# Patient Record
Sex: MALE | Race: WHITE | URBAN - METROPOLITAN AREA
[De-identification: names, ages, dates, MRNs, and addresses within clinical notes are randomized per-mention and may not be internally consistent; named-entity substitution may affect disease eponyms.]

---

## 2017-04-25 ENCOUNTER — OFFICE VISIT (OUTPATIENT)
Dept: DERMATOLOGY | Facility: CLINIC | Age: 72
End: 2017-04-25

## 2017-04-25 VITALS — DIASTOLIC BLOOD PRESSURE: 91 MMHG | SYSTOLIC BLOOD PRESSURE: 148 MMHG | HEART RATE: 76 BPM

## 2017-04-25 DIAGNOSIS — L82.1 SK (SEBORRHEIC KERATOSIS): ICD-10-CM

## 2017-04-25 DIAGNOSIS — L57.0 ACTINIC KERATOSIS: Primary | ICD-10-CM

## 2017-04-25 DIAGNOSIS — L82.0 INFLAMED SEBORRHEIC KERATOSIS: ICD-10-CM

## 2017-04-25 ASSESSMENT — PAIN SCALES - GENERAL: PAINLEVEL: NO PAIN (0)

## 2017-04-25 NOTE — PATIENT INSTRUCTIONS
Cryotherapy    What is it?    Use of a very cold liquid, such as liquid nitrogen, to freeze and destroy abnormal skin cells that need to be removed    What should I expect?    Tenderness and redness    A small blister that might grow and fill with dark purple blood. There may be crusting.    More than one treatment may be needed if the lesions do not go away.    How do I care for the treated area?    Gently wash the area with your hands when bathing.    Use a thin layer of Vaseline to help with healing. You may use a Band-Aid.     The area should heal within 7-10 days and may leave behind a pink or lighter color.     Do not use an antibiotic or Neosporin ointment.     You may take acetaminophen (Tylenol) for pain.     Call your Doctor if you have:    Severe pain    Signs of infection (warmth, redness, cloudy yellow drainage, and or a bad smell)    Questions or concerns    Who should I call with questions?       [facility]: [phone]       [facility]: [phone]       For urgent needs outside of business hours call the [facility] at [phone]        and ask for the dermatology resident on call

## 2017-04-25 NOTE — MR AVS SNAPSHOT
After Visit Summary   4/25/2017    Henry Hill    MRN: [id number]           Patient Information     Date Of Birth          1945        Visit Information        Provider Department      4/25/2017 10:15 AM Anna Lora MD [facility] Dermatology        Today's Diagnoses     Actinic keratosis    -  1    SK (seborrheic keratosis)        Inflamed seborrheic keratosis          Care Instructions    Cryotherapy    What is it?    Use of a very cold liquid, such as liquid nitrogen, to freeze and destroy abnormal skin cells that need to be removed    What should I expect?    Tenderness and redness    A small blister that might grow and fill with dark purple blood. There may be crusting.    More than one treatment may be needed if the lesions do not go away.    How do I care for the treated area?    Gently wash the area with your hands when bathing.    Use a thin layer of Vaseline to help with healing. You may use a Band-Aid.     The area should heal within 7-10 days and may leave behind a pink or lighter color.     Do not use an antibiotic or Neosporin ointment.     You may take acetaminophen (Tylenol) for pain.     Call your Doctor if you have:    Severe pain    Signs of infection (warmth, redness, cloudy yellow drainage, and or a bad smell)    Questions or concerns    Who should I call with questions?       [facility]: [phone]       [facility]: [phone]       For urgent needs outside of business hours call the [facility] at [phone]        and ask for the dermatology resident on call          Follow-ups after your visit        Follow-up notes from your care team     Return in about 6 months (around 10/25/2017).      Who to contact     Please call your clinic at [phone] to:    Ask questions about your health    Make or cancel appointments    Discuss your medicines    Learn about your test results    Speak to  your doctor   If you have compliments or concerns about an experience at your clinic, or if you wish to file a complaint, please contact [facility] Patient Relations at [phone] or email us at [email or web address]         Additional Information About Your Visit        MyChart Information     [employer] gives you secure access to your electronic health record. If you see a primary care provider, you can also send messages to your care team and make appointments. If you have questions, please call your primary care clinic.  If you do not have a primary care provider, please call [phone] and they will assist you.      [employer] is an electronic gateway that provides easy, online access to your medical records. With [employer], you can request a clinic appointment, read your test results, renew a prescription or communicate with your care team.     To access your existing account, please contact your [facility] or call [phone] for assistance.        Care EveryWhere ID     This is your Care EveryWhere ID. This could be used by other organizations to access your [place] medical records  [id number]        Your Vitals Were     Pulse                   76            Blood Pressure from Last 3 Encounters:   04/25/17 (!) 148/91    Weight from Last 3 Encounters:   No data found for Wt              We Performed the Following     DESTRUCT BENIGN LESION, UP TO 14     DESTRUCT PREMALIGNANT LESION, 2-14     DESTRUCT PREMALIGNANT LESION, FIRST        Primary Care Provider Office Phone # Fax #    Rafa Blanco [phone]       [place] [address] [facility] [address]        Thank you!     Thank you for choosing [facility] DERMATOLOGY  for your care. Our goal is always to provide you with excellent care. Hearing back from our patients is one way we can continue to improve our services. Please take a few minutes to complete the  written survey that you may receive in the mail after your visit with us. Thank you!             Your Updated Medication List - Protect others around you: Learn how to safely use, store and throw away your medicines at www.disposemymeds.org.          This list is accurate as of: 4/25/17 11:59 PM.  Always use your most recent med list.                   Brand Name Dispense Instructions for use    ibuprofen 400 MG tablet    ADVIL/MOTRIN     Take 400 mg by mouth 2 times daily       VIAGRA 100 MG cap/tab   Generic drug:  sildenafil      Take 100 mg by mouth as needed

## 2017-04-25 NOTE — NURSING NOTE
"Dermatology Rooming Note    Henry Hill's goals for this visit include:   Chief Complaint   Patient presents with     Derm Problem     Henry is here today for a skin check.  Has concerns with an area on the back of his left thigh that feels \"rough, and can bleed when messed with.\"         Fatmata Georges, LPN    "

## 2017-04-25 NOTE — LETTER
4/25/2017       RE: Henry Hill  [address] [place] [address]     Dear Colleague,    Thank you for referring your patient, Henry Hill, to the [facility]. Please see a copy of my visit note below.    DERMATOLOGY PROBLEM LIST:  Actinic keratoses, status post treatment with photodynamic therapy to the scalp with significant improvement, status post treatment with cryotherapy.        CHIEF COMPLAINT:  Skin check.      HISTORY OF PRESENT ILLNESS:  The patient is a 71-year-old male who returns today for followup of actinic keratoses.  He was last seen by our physician assistant, Kenzie Miguel.  At the last visit, he had undergone photodynamic therapy with significant improvement in the scalp.  He still had some residual actinic keratoses, and these were treated with cryotherapy.  He was supposed to come back 3 months after that, but had some difficulties setting up a time due to now having a job at the [employer], and he was scheduled for today.  He feels he is doing well.  He has not noticed any areas that he is specifically concerned about.  He does have an area on his left posterior thigh, which has been crusty; he has been scratching and picking at it.  It does bleed when he scratches at it.  He would like to have it removed if possible.  He does not have any personal history of skin cancer, but does have significant sun exposure, including tanning beds.      PHYSICAL EXAMINATION:  The patient is a well-appearing male in no acute distress.  Please note the nursing note for vital signs.  Scalp, face, trunk, upper and lower extremities are examined today.  He does have red, gritty papules on the scalp, left eyebrow and tip of nose.  He has a waxy, stuck-on papule of his left posterior thigh.  Scattered, other waxy, stuck-on papules, no atypia.  Scattered lentigo and lentigines, no atypia.      ASSESSMENT AND PLAN:   1.   Actinic keratoses.  A total of 6 were treated today with cryotherapy, including the scalp, left eyebrow and nasal tip.  The patient tolerated it well.  We may need to consider another round of PDT in the future, but not today.   2.  Irritated, inflamed seborrheic keratosis, left posterior thigh.  It has been bleeding and scratched at.  He was treated with 2 cycles of cryotherapy today.   3.  Seborrheic keratoses.  Mostly the seborrheic keratoses are not bothersome.  Reassurance given.  Plan for followup in 6 months for another skin check.     Again, thank you for allowing me to participate in the care of your patient.      Sincerely,    Anna Lora MD

## 2018-02-06 ENCOUNTER — OFFICE VISIT (OUTPATIENT)
Dept: DERMATOLOGY | Facility: CLINIC | Age: 73
End: 2018-02-06
Payer: COMMERCIAL

## 2018-02-06 DIAGNOSIS — L82.1 SK (SEBORRHEIC KERATOSIS): ICD-10-CM

## 2018-02-06 DIAGNOSIS — L57.0 ACTINIC KERATOSIS: Primary | ICD-10-CM

## 2018-02-06 ASSESSMENT — PAIN SCALES - GENERAL
PAINLEVEL: NO PAIN (0)
PAINLEVEL: NO PAIN (1)

## 2018-02-06 NOTE — PATIENT INSTRUCTIONS
Cryotherapy    What is it?    Use of a very cold liquid, such as liquid nitrogen, to freeze and destroy abnormal skin cells that need to be removed    What should I expect?    Tenderness and redness    A small blister that might grow and fill with dark purple blood. There may be crusting.    More than one treatment may be needed if the lesions do not go away.    How do I care for the treated area?    Gently wash the area with your hands when bathing.    Use a thin layer of Vaseline to help with healing. You may use a Band-Aid.     The area should heal within 7-10 days and may leave behind a pink or lighter color.     Do not use an antibiotic or Neosporin ointment.     You may take acetaminophen (Tylenol) for pain.     Call your Doctor if you have:    Severe pain    Signs of infection (warmth, redness, cloudy yellow drainage, and or a bad smell)    Questions or concerns    Who should I call with questions?       [facility]: [phone]       [facility]: [phone]       For urgent needs outside of business hours call the [facility] at [phone]        and ask for the dermatology resident on call

## 2018-02-06 NOTE — PROGRESS NOTES
Progress Notes   Anna Lora MD (Physician)     Dermatology      DERMATOLOGY PROBLEM LIST:  Actinic keratoses, status post treatment with photodynamic therapy to the scalp with significant improvement, status post treatment with cryotherapy.         CHIEF COMPLAINT:  Skin check.       HISTORY OF PRESENT ILLNESS:  The patient is a 72-year-old male who returns today for followup of actinic keratoses.  He had undergone photodynamic therapy with significant improvement in the scalp.  He still had some residual actinic keratoses, and these were treated with cryotherapy at the last visit.  He has noted a few scaly areas of the scalp but overall feels his skin is doing very well.  He does not have any personal history of skin cancer, but does have significant sun exposure, including tanning beds.       PHYSICAL EXAMINATION:  The patient is a well-appearing male in no acute distress.  Please note the nursing note for vital signs.  Scalp, face, trunk, upper and lower extremities are examined today.  He does have red, gritty papules on the scalp.  He has a multiple waxy, stuck-on papules, scattered lentigo and lentigines, no atypia.       ASSESSMENT AND PLAN:   1.  Actinic keratoses.  A total of 4 were treated today with cryotherapy on the scalp.  The patient tolerated it well.  We may need to consider another round of PDT in the future, but not today.   2.  Seborrheic keratosis, non irritated.  No treatment  Follow up in one year.

## 2018-02-06 NOTE — NURSING NOTE
Dermatology Rooming Note    Henry Hill's goals for this visit include:   Chief Complaint   Patient presents with     Skin Check     Henry is here today for a skin check- no areas of concern.      Traci Lyon MA

## 2018-02-06 NOTE — LETTER
2/6/2018       RE: Henry Hill  [address] [facility] [address]     Dear Colleague,    Thank you for referring your patient, Henry Hill, to the [facility] DERMATOLOGY at [facility]. Please see a copy of my visit note below.    Progress Notes   Anna Lora MD (Physician)     Dermatology      DERMATOLOGY PROBLEM LIST:  Actinic keratoses, status post treatment with photodynamic therapy to the scalp with significant improvement, status post treatment with cryotherapy.         CHIEF COMPLAINT:  Skin check.       HISTORY OF PRESENT ILLNESS:  The patient is a 72-year-old male who returns today for followup of actinic keratoses.  He had undergone photodynamic therapy with significant improvement in the scalp.  He still had some residual actinic keratoses, and these were treated with cryotherapy at the last visit.  He has noted a few scaly areas of the scalp but overall feels his skin is doing very well.  He does not have any personal history of skin cancer, but does have significant sun exposure, including tanning beds.       PHYSICAL EXAMINATION:  The patient is a well-appearing male in no acute distress.  Please note the nursing note for vital signs.  Scalp, face, trunk, upper and lower extremities are examined today.  He does have red, gritty papules on the scalp.  He has a multiple waxy, stuck-on papules, scattered lentigo and lentigines, no atypia.       ASSESSMENT AND PLAN:   1.  Actinic keratoses.  A total of 4 were treated today with cryotherapy on the scalp.  The patient tolerated it well.  We may need to consider another round of PDT in the future, but not today.   2.   Seborrheic keratosis, non irritated.  No treatment  Follow up in one year.

## 2018-02-06 NOTE — MR AVS SNAPSHOT
After Visit Summary   2/6/2018    Henry Hill    MRN: [id number]           Patient Information     Date Of Birth          1945        Visit Information        Provider Department      2/6/2018 10:15 AM Anna Lora MD [facility] Dermatology        Today's Diagnoses     Actinic keratosis    -  1    SK (seborrheic keratosis)          Care Instructions    Cryotherapy    What is it?    Use of a very cold liquid, such as liquid nitrogen, to freeze and destroy abnormal skin cells that need to be removed    What should I expect?    Tenderness and redness    A small blister that might grow and fill with dark purple blood. There may be crusting.    More than one treatment may be needed if the lesions do not go away.    How do I care for the treated area?    Gently wash the area with your hands when bathing.    Use a thin layer of Vaseline to help with healing. You may use a Band-Aid.     The area should heal within 7-10 days and may leave behind a pink or lighter color.     Do not use an antibiotic or Neosporin ointment.     You may take acetaminophen (Tylenol) for pain.     Call your Doctor if you have:    Severe pain    Signs of infection (warmth, redness, cloudy yellow drainage, and or a bad smell)    Questions or concerns    Who should I call with questions?       [facility]: [phone]       [facility]: [phone]       For urgent needs outside of business hours call the [facility] at [phone]        and ask for the dermatology resident on call            Follow-ups after your visit        Who to contact     Please call your clinic at [phone] to:    Ask questions about your health    Make or cancel appointments    Discuss your medicines    Learn about your test results    Speak to your doctor   If you have compliments or concerns about an experience at your clinic, or if you wish to file a complaint,  please contact [facility] Patient Relations at [phone] or email us at [email or web address]         Additional Information About Your Visit        [employer] Information     [employer] gives you secure access to your electronic health record. If you see a primary care provider, you can also send messages to your care team and make appointments. If you have questions, please call your primary care clinic.  If you do not have a primary care provider, please call [phone] and they will assist you.      [employer] is an electronic gateway that provides easy, online access to your medical records. With [employer], you can request a clinic appointment, read your test results, renew a prescription or communicate with your care team.     To access your existing account, please contact your [facility] or call [phone] for assistance.        Care EveryWhere ID     This is your Care EveryWhere ID. This could be used by other organizations to access your [place] medical records  [id number]         Blood Pressure from Last 3 Encounters:   04/25/17 (!) 148/91    Weight from Last 3 Encounters:   No data found for Wt              We Performed the Following     DESTRUCT PREMALIGNANT LESION, 2-14     DESTRUCT PREMALIGNANT LESION, FIRST        Primary Care Provider Office Phone # Fax #    Rafa Blanco [phone]       [facility] [address] [facility] [address]        Equal Access to Services     ABISAI SHIPMAN : Hadii ridge montero hadasho Soomaali, waaxda luqadaha, qaybta kaalmada lee, lefty ovalle. So [facility] [phone].    ATENCIÓN: Si habla español, tiene a kohler disposición servicios gratuitos de asistencia lingüística. Lou al [phone].    We comply with applicable federal civil rights laws and [place] laws. We do not discriminate on the basis of race, color, national origin, age, disability,  sex, sexual orientation, or gender identity.            Thank you!     Thank you for choosing [facility] DERMATOLOGY  for your care. Our goal is always to provide you with excellent care. Hearing back from our patients is one way we can continue to improve our services. Please take a few minutes to complete the written survey that you may receive in the mail after your visit with us. Thank you!             Your Updated Medication List - Protect others around you: Learn how to safely use, store and throw away your medicines at www.disposemymeds.org.          This list is accurate as of 2/6/18 10:29 AM.  Always use your most recent med list.                   Brand Name Dispense Instructions for use Diagnosis    ibuprofen 400 MG tablet    ADVIL/MOTRIN     Take 400 mg by mouth 2 times daily        VIAGRA 100 MG tablet   Generic drug:  sildenafil      Take 100 mg by mouth as needed

## 2019-01-09 ENCOUNTER — PATIENT OUTREACH (OUTPATIENT)
Dept: CARE COORDINATION | Facility: CLINIC | Age: 74
End: 2019-01-09

## 2019-01-22 ENCOUNTER — OFFICE VISIT (OUTPATIENT)
Dept: DERMATOLOGY | Facility: CLINIC | Age: 74
End: 2019-01-22
Payer: COMMERCIAL

## 2019-01-22 DIAGNOSIS — L82.1 SK (SEBORRHEIC KERATOSIS): Primary | ICD-10-CM

## 2019-01-22 DIAGNOSIS — D22.9 MULTIPLE NEVI: ICD-10-CM

## 2019-01-22 RX ORDER — ATORVASTATIN CALCIUM 20 MG/1
20 TABLET, FILM COATED ORAL
COMMUNITY
Start: 2019-01-11

## 2019-01-22 RX ORDER — LISINOPRIL AND HYDROCHLOROTHIAZIDE 12.5; 2 MG/1; MG/1
1 TABLET ORAL
COMMUNITY
Start: 2018-12-04

## 2019-01-22 ASSESSMENT — PAIN SCALES - GENERAL: PAINLEVEL: NO PAIN (0)

## 2019-01-22 NOTE — PROGRESS NOTES
[facility] Dermatology Note      Dermatology Problem List:  1. AKs  - s/p cryo and PDT to scalp  2. Xerosis cutis  3. Benign nevi  4. SKs    Encounter Date: Jan 22, 2019    CC:   Chief Complaint   Patient presents with     Skin Check     Henry is here for skin check.         History of Present Illness:  Mr. Henry Hill is a 73 year old male who presents for FBSE. Last seen 2/6/18 at which time he had 4 AKs treated with LN2. Had a small stroke since he was seen last time. Patient denies any painful, bleeding, changing, or darkening lesions.    Patient reports occasional use of SPF. Does wear a hat. Reports history of tanning bed use (~12). Reports history of blistering sunburns (1-2 times). No personal or family history of skin cancer.     Will be moving to [place] permanently.     Past Medical History:   Patient Active Problem List   Diagnosis     Actinic keratosis     SK (seborrheic keratosis)     Past Medical History:   Diagnosis Date     Actinic keratosis 11/4/2016     Arthritis      No past surgical history on file.    Social History:  Patient reports that  has never smoked. he has never used smokeless tobacco. He reports that he drinks alcohol. He reports that he does not use drugs.    Family History:  Family History   Problem Relation Age of Onset     Melanoma No family hx of      Skin Cancer No family hx of        Medications:  Current Outpatient Medications   Medication Sig Dispense Refill     atorvastatin (LIPITOR) 20 MG tablet Take 20 mg by mouth       cholecalciferol (VITAMIN D-1000 MAX ST) 1000 units TABS Take 1,000 Units by mouth       ibuprofen (ADVIL,MOTRIN) 400 MG tablet Take 400 mg by mouth 2 times daily       lisinopril-hydrochlorothiazide (PRINZIDE/ZESTORETIC) 20-12.5 MG tablet Take 1 tablet by mouth       sildenafil (VIAGRA) 100 MG tablet Take 100 mg by mouth as needed          Allergies   Allergen Reactions     Morphine Nausea         Review of Systems:  -As per  HPI  -Constitutional: Otherwise feeling well today, in usual state of health.  -HEENT: Patient denies nonhealing oral sores.  -Skin: As above in HPI. No additional skin concerns.    Physical exam:  Vitals: There were no vitals taken for this visit.  GEN: This is a well developed, well-nourished male in no acute distress, in a pleasant mood.    SKIN: Total skin excluding the undergarment areas was performed. The exam included the head/face, neck, both arms, chest, back, abdomen, buttocks, both legs, digits and/or nails.   -Multiple regular brown pigmented macules and papules are identified on the trunk.   -There are waxy stuck on tan to brown papules on the trunk and extremities.  -There is xerosis of the lower legs.   -No other lesions of concern on areas examined.     Impression/Plan:  1. Actinic keratoses: Previously treated with cryo and PDT to scalp.     None on exam today    ABCDs of melanoma were discussed and self skin checks were advised.    Sun precaution was advised including the use of sun screens of SPF 30 or higher, sun protective clothing, and avoidance of tanning beds.    2. Multiple clinically benign nevi on the trunk    Sun protection as above    3. Seborrheic keratosis, non irritated    Reassured of benign nature of lesions    4. Xerosis cutis    Encouraged gentle skin care with liberal emollient use throughout day      Follow-up in 1 year for FBSE, earlier for new or changing lesions.  Given the name of Dr Filippo Vora to follow up with in [place].       Dr. Lora staffed the patient.    Staff Involved:  Resident(Staci Messer)/Staff(as above)    Staci Messer M.D.  PGY-3 Resident  Dermatology  [facility]  Pager [phone]    .I, Anna Lora MD, saw this patient with the resident and agree with the resident s findings and plan of care as documented in the resident s note.

## 2019-01-22 NOTE — NURSING NOTE
Dermatology Rooming Note    Henry Hill's goals for this visit include:   Chief Complaint   Patient presents with     Skin Check     Henry is here for skin check.     Milady Dhaliwal, CMA

## 2019-01-22 NOTE — PATIENT INSTRUCTIONS
You can see Dr. Filippo Vora in [place] at [facility].       Dry Skin    What is dry skin?    Common skin problem    Can be worse during the winter     Affects all ages    Occurs in people with or without other skin problems    What does it look like?    Fine lines in the skin become more visible     Rough feeling skin     Flaky skin    Most common on the arms and legs    Skin can become cracked, especially on the hands and feet    What are some problems caused by dry skin?     Itching    Rubbing or scratching can cause thickened, rough skin patches    Cracks in skin can be painful    Red, itchy, scaly skin (called eczema) can occur    Yellow crusting or pus could be signs of an infection    What causes dry skin?    A lack of water in the top layer of the skin    Too much soapy water,  hot water, or harsh chemicals    Aging and sun damage    How do I treat dry skin?    Shower or bathe daily for under ten minutes with lukewarm water and mild soap.    Pat yourself dry with a towel gently and leave your skin slightly damp.    Use moisturizing cream or ointment right away.  Avoid lotions.    What kind of mild soap should I be using?    Camay , Dove , Tone , Neutrogena , Purpose , or Oil of Olay     A non-detergent cleanser, like Cetaphil , can be used.    What should I stay away from?    Scented soaps     Bath oils    What moisturizers should I be using?    Cetaphil Cream,CeraVe Cream, Vanicream, Aquaphilic, Eucerin, Aquaphor, or Vaseline     Always apply after showering or bathing.    Reapply throughout the day, if possible.    If dry skin affects your hands, always reapply after handwashing.    What else should I know?    Using a humidifier during winter months may help.    If dry skin gets worse or if eczema develops, a steroid cream may be needed.

## 2019-01-22 NOTE — LETTER
1/22/2019       RE: Henry Hill  [address] [facility] [address]     Dear Colleague,    Thank you for referring your patient, Henry Hill, to the [facility] DERMATOLOGY at [facility]. Please see a copy of my visit note below.    [facility] Dermatology Note      Dermatology Problem List:  1. AKs  - s/p cryo and PDT to scalp  2. Xerosis cutis  3. Benign nevi  4. SKs    Encounter Date: Jan 22, 2019    CC:   Chief Complaint   Patient presents with     Skin Check     Henry is here for skin check.         History of Present Illness:  Mr. Henry Hill is a 73 year old male who presents for FBSE. Last seen 2/6/18 at which time he had 4 AKs treated with LN2. Had a small stroke since he was seen last time. Patient denies any painful, bleeding, changing, or darkening lesions.    Patient reports occasional use of SPF. Does wear a hat. Reports history of tanning bed use (~12). Reports history of blistering sunburns (1-2 times). No personal or family history of skin cancer.     [place]     Past Medical History:   Patient Active Problem List   Diagnosis     Actinic keratosis     SK (seborrheic keratosis)     Past Medical History:   Diagnosis Date     Actinic keratosis 11/4/2016     Arthritis      No past surgical history on file.    Social History:  Patient reports that  has never smoked. he has never used smokeless tobacco. He reports that he drinks alcohol. He reports that he does not use drugs.    Family History:  Family History   Problem Relation Age of Onset     Melanoma No family hx of      Skin Cancer No family hx of        Medications:  Current Outpatient Medications   Medication Sig Dispense Refill     atorvastatin (LIPITOR) 20 MG tablet Take 20 mg by mouth       cholecalciferol (VITAMIN D-1000 MAX ST) 1000 units TABS Take 1,000 Units by mouth       ibuprofen (ADVIL,MOTRIN) 400 MG tablet Take 400 mg by mouth 2  times daily       lisinopril-hydrochlorothiazide (PRINZIDE/ZESTORETIC) 20-12.5 MG tablet Take 1 tablet by mouth       sildenafil (VIAGRA) 100 MG tablet Take 100 mg by mouth as needed          Allergies   Allergen Reactions     Morphine Nausea         Review of Systems:  -As per HPI  -Constitutional: Otherwise feeling well today, in usual state of health.  -HEENT: Patient denies nonhealing oral sores.  -Skin: As above in HPI. No additional skin concerns.    Physical exam:  Vitals: There were no vitals taken for this visit.  GEN: This is a well developed, well-nourished male in no acute distress, in a pleasant mood.    SKIN: Total skin excluding the undergarment areas was performed. The exam included the head/face, neck, both arms, chest, back, abdomen, buttocks, both legs, digits and/or nails.   -Multiple regular brown pigmented macules and papules are identified on the trunk.   -There are waxy stuck on tan to brown papules on the trunk and extremities.  -There is xerosis of the lower legs.   -No other lesions of concern on areas examined.     Impression/Plan:  1. Actinic keratoses: Previously treated with cryo and PDT to scalp.     None on exam today    ABCDs of melanoma were discussed and self skin checks were advised.    Sun precaution was advised including the use of sun screens of SPF 30 or higher, sun protective clothing, and avoidance of tanning beds.    2. Multiple clinically benign nevi on the trunk    Sun protection as above    3. Seborrheic keratosis, non irritated    Reassured of benign nature of lesions    4. Xerosis cutis    Encouraged gentle skin care with liberal emollient use throughout day      Follow-up in 1 year for FBSE, earlier for new or changing lesions.  Given the name of Dr Filippo Vora to follow up with in [place].       Dr. Lora staffed the patient.    Staff Involved:  Resident(Staci Messer)/Staff(as above)    Staci Messer M.D.  PGY-3 Resident  Dermatology  [facility]  Pager  [phone]    .I, Anna Lora MD, saw this patient with the resident and agree with the resident s findings and plan of care as documented in the resident s note.      Again, thank you for allowing me to participate in the care of your patient.      Sincerely,    Anna Lora MD

## 2020-03-10 ENCOUNTER — HEALTH MAINTENANCE LETTER (OUTPATIENT)
Age: 75
End: 2020-03-10

## 2020-12-27 ENCOUNTER — HEALTH MAINTENANCE LETTER (OUTPATIENT)
Age: 75
End: 2020-12-27

## 2021-04-24 ENCOUNTER — HEALTH MAINTENANCE LETTER (OUTPATIENT)
Age: 76
End: 2021-04-24

## 2021-04-29 ENCOUNTER — OFFICE VISIT (OUTPATIENT)
Dept: URBAN - METROPOLITAN AREA CLINIC 59 | Facility: CLINIC | Age: 76
End: 2021-04-29
Payer: COMMERCIAL

## 2021-04-29 DIAGNOSIS — H25.13 AGE-RELATED NUCLEAR CATARACT, BILATERAL: Primary | ICD-10-CM

## 2021-04-29 DIAGNOSIS — H52.4 PRESBYOPIA: ICD-10-CM

## 2021-04-29 DIAGNOSIS — H43.812 VITREOUS DEGENERATION, LEFT EYE: ICD-10-CM

## 2021-04-29 DIAGNOSIS — H17.9 CORNEAL SCAR: ICD-10-CM

## 2021-04-29 PROCEDURE — 92004 COMPRE OPH EXAM NEW PT 1/>: CPT | Performed by: OPTOMETRIST

## 2021-04-29 ASSESSMENT — VISUAL ACUITY
OD: 20/25
OS: 20/20

## 2021-04-29 ASSESSMENT — INTRAOCULAR PRESSURE
OS: 14
OD: 13

## 2021-04-29 NOTE — IMPRESSION/PLAN
Impression: Dry eye syndrome of bilateral lacrimal glands: H04.123. Plan: Patient educated he may want to increase artificial tears to QID OU.

## 2021-04-29 NOTE — IMPRESSION/PLAN
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 Plan: Symptoms of retinal detachment or tears were discussed in detail. If patient notices any symptoms discussed, contact office ASAP.

## 2021-04-29 NOTE — IMPRESSION/PLAN
Impression: Presbyopia: H52.4. Plan: New glasses Rx was given today. Patient educated that glasses will not be perfect due to cataracts.

## 2021-10-09 ENCOUNTER — HEALTH MAINTENANCE LETTER (OUTPATIENT)
Age: 76
End: 2021-10-09

## 2021-11-05 ENCOUNTER — OFFICE VISIT (OUTPATIENT)
Dept: URBAN - METROPOLITAN AREA CLINIC 59 | Facility: CLINIC | Age: 76
End: 2021-11-05
Payer: COMMERCIAL

## 2021-11-05 DIAGNOSIS — H43.813 VITREOUS DEGENERATION, BILATERAL: ICD-10-CM

## 2021-11-05 DIAGNOSIS — H25.813 COMBINED FORMS OF AGE-RELATED CATARACT, BILATERAL: Primary | ICD-10-CM

## 2021-11-05 PROCEDURE — 92014 COMPRE OPH EXAM EST PT 1/>: CPT | Performed by: OPTOMETRIST

## 2021-11-05 ASSESSMENT — VISUAL ACUITY
OD: 20/30
OS: 20/30

## 2021-11-05 ASSESSMENT — INTRAOCULAR PRESSURE
OD: 15
OS: 17

## 2021-11-05 NOTE — IMPRESSION/PLAN
Impression: Dry eye syndrome of bilateral lacrimal glands: H04.123.
by history Plan: Patient educated he may want to increase artificial tears to QID OU.

## 2021-11-05 NOTE — IMPRESSION/PLAN
Impression: Combined forms of age-related cataract, bilateral: H25.813. Plan: Discussed diagnosis of cataracts with patient. Patient educated on new PSC cataracts and affects on vision. Patient has no significant visual complaints at this time and is able to perform all their daily activities. We will re-evaluate cataract on return visit unless patient notes any changes sooner.

## 2022-01-01 NOTE — PROGRESS NOTES
DERMATOLOGY PROBLEM LIST:  Actinic keratoses, status post treatment with photodynamic therapy to the scalp with significant improvement, status post treatment with cryotherapy.        CHIEF COMPLAINT:  Skin check.      HISTORY OF PRESENT ILLNESS:  The patient is a 71-year-old male who returns today for followup of actinic keratoses.  He was last seen by our physician assistant, Kenzie Miguel.  At the last visit, he had undergone photodynamic therapy with significant improvement in the scalp.  He still had some residual actinic keratoses, and these were treated with cryotherapy.  He was supposed to come back 3 months after that, but had some difficulties setting up a time due to now having a job at the [employer], and he was scheduled for today.  He feels he is doing well.  He has not noticed any areas that he is specifically concerned about.  He does have an area on his left posterior thigh, which has been crusty; he has been scratching and picking at it.  It does bleed when he scratches at it.  He would like to have it removed if possible.  He does not have any personal history of skin cancer, but does have significant sun exposure, including tanning beds.      PHYSICAL EXAMINATION:  The patient is a well-appearing male in no acute distress.  Please note the nursing note for vital signs.  Scalp, face, trunk, upper and lower extremities are examined today.  He does have red, gritty papules on the scalp, left eyebrow and tip of nose.  He has a waxy, stuck-on papule of his left posterior thigh.  Scattered, other waxy, stuck-on papules, no atypia.  Scattered lentigo and lentigines, no atypia.      ASSESSMENT AND PLAN:   1.  Actinic keratoses.  A total of 6 were treated today with cryotherapy, including the scalp, left eyebrow and nasal tip.  The patient tolerated it well.  We may need to consider another round of PDT in the future, but not today.   2.  Irritated, inflamed seborrheic keratosis, left posterior  thigh.  It has been bleeding and scratched at.  He was treated with 2 cycles of cryotherapy today.   3.  Seborrheic keratoses.  Mostly the seborrheic keratoses are not bothersome.  Reassurance given.  Plan for followup in 6 months for another skin check.          Statement Selected

## 2022-01-11 ENCOUNTER — OFFICE VISIT (OUTPATIENT)
Dept: URBAN - METROPOLITAN AREA CLINIC 59 | Facility: CLINIC | Age: 77
End: 2022-01-11
Payer: COMMERCIAL

## 2022-01-11 DIAGNOSIS — H04.123 DRY EYE SYNDROME OF BILATERAL LACRIMAL GLANDS: ICD-10-CM

## 2022-01-11 PROCEDURE — 92014 COMPRE OPH EXAM EST PT 1/>: CPT | Performed by: OPTOMETRIST

## 2022-01-11 ASSESSMENT — INTRAOCULAR PRESSURE
OD: 15
OS: 16

## 2022-01-11 ASSESSMENT — VISUAL ACUITY
OD: 20/40
OS: 20/25

## 2022-01-11 NOTE — IMPRESSION/PLAN
Impression: Dry eye syndrome of bilateral lacrimal glands: H04.123.
by history Plan: Continue AT's OU QID.

## 2022-01-11 NOTE — IMPRESSION/PLAN
Impression: Combined forms of age-related cataract, bilateral: H25.813. Plan: Cataract accounts for patient's complaints. Discussed all risks, benefits, procedures and recovery. Patient desires to have surgery, recommend CE w/IOL. Recommend surgery OU. Ascan needed. RTC for surgeon consult.

## 2022-01-11 NOTE — IMPRESSION/PLAN
Impression: Vitreous degeneration, bilateral: H43.813. Plan: Appears stable. Symptoms of retinal detachment or tears were discussed in detail. If patient notices any symptoms discussed, contact office ASAP.

## 2022-02-11 ENCOUNTER — OFFICE VISIT (OUTPATIENT)
Dept: URBAN - METROPOLITAN AREA CLINIC 60 | Facility: CLINIC | Age: 77
End: 2022-02-11
Payer: COMMERCIAL

## 2022-02-11 PROCEDURE — 99204 OFFICE O/P NEW MOD 45 MIN: CPT | Performed by: OPHTHALMOLOGY

## 2022-02-11 ASSESSMENT — KERATOMETRY
OS: 42.66
OD: 42.65

## 2022-02-11 ASSESSMENT — INTRAOCULAR PRESSURE
OS: 16
OD: 14

## 2022-02-11 NOTE — IMPRESSION/PLAN
Impression: Combined forms of age-related cataract, bilateral: H25.813. Plan: Cataract accounts for pt complaints. Pt desires sx. Schedule CE/IOL both eyes, right eye then left. Risk/Benefits/Alternatives discussed with patient. Rec. mono-focal/Toric/Vivity. Consider ORA/LRI. RL2. Target distance. Order a-scan. Patient is a candidate for Dexycu. Discussed R/B/A. Recommend Ofloxacin or Tobramycin QID for 1 week postop. Intra-cameral Moxifloxacin to decrease the risk of infection. May elect to use combination drops or generics per patient preference.

## 2022-03-31 ENCOUNTER — TESTING ONLY (OUTPATIENT)
Dept: URBAN - METROPOLITAN AREA CLINIC 60 | Facility: CLINIC | Age: 77
End: 2022-03-31
Payer: COMMERCIAL

## 2022-03-31 ASSESSMENT — PACHYMETRY
OD: 23.55
OS: 23.47

## 2022-04-14 ENCOUNTER — PROCEDURE (OUTPATIENT)
Dept: URBAN - METROPOLITAN AREA SURGERY 37 | Facility: SURGERY | Age: 77
End: 2022-04-14
Payer: COMMERCIAL

## 2022-04-14 DIAGNOSIS — H25.813 COMBINED FORMS OF AGE-RELATED CATARACT, BILATERAL: Primary | ICD-10-CM

## 2022-04-14 PROCEDURE — 66984 XCAPSL CTRC RMVL W/O ECP: CPT | Performed by: OPHTHALMOLOGY

## 2022-04-15 ENCOUNTER — POST-OPERATIVE VISIT (OUTPATIENT)
Dept: URBAN - METROPOLITAN AREA CLINIC 59 | Facility: CLINIC | Age: 77
End: 2022-04-15
Payer: COMMERCIAL

## 2022-04-15 DIAGNOSIS — Z48.810 ENCOUNTER FOR SURGICAL AFTERCARE FOLLOWING SURGERY ON A SENSE ORGAN: Primary | ICD-10-CM

## 2022-04-15 PROCEDURE — 99024 POSTOP FOLLOW-UP VISIT: CPT | Performed by: OPTOMETRIST

## 2022-04-15 NOTE — IMPRESSION/PLAN
Impression: S/P Cataract Extraction by phacoemulsification with IOL placement OD - 1 Day. Encounter for surgical aftercare following surgery on a sense organ  Z48.810.  Excellent post op course   Post operative instructions reviewed - Plan: --Continue Ofloxacin 0.3% QID OD for 1 week

## 2022-04-21 ENCOUNTER — POST-OPERATIVE VISIT (OUTPATIENT)
Dept: URBAN - METROPOLITAN AREA CLINIC 60 | Facility: CLINIC | Age: 77
End: 2022-04-21
Payer: COMMERCIAL

## 2022-04-21 DIAGNOSIS — Z48.810 ENCOUNTER FOR SURGICAL AFTERCARE FOLLOWING SURGERY ON A SENSE ORGAN: ICD-10-CM

## 2022-04-21 DIAGNOSIS — H52.4 PRESBYOPIA: Primary | ICD-10-CM

## 2022-04-21 PROCEDURE — 99024 POSTOP FOLLOW-UP VISIT: CPT | Performed by: OPTOMETRIST

## 2022-04-21 ASSESSMENT — VISUAL ACUITY: OD: 20/20

## 2022-04-21 ASSESSMENT — INTRAOCULAR PRESSURE: OD: 16

## 2022-04-21 NOTE — IMPRESSION/PLAN
Impression: S/P Cataract Extraction by phacoemulsification with IOL placement OD - 7 Days. Encounter for surgical aftercare following surgery on a sense organ  Z48.190.  Plan:

## 2022-05-11 ENCOUNTER — PROCEDURE (OUTPATIENT)
Dept: URBAN - METROPOLITAN AREA SURGERY 37 | Facility: SURGERY | Age: 77
End: 2022-05-11
Payer: COMMERCIAL

## 2022-05-11 DIAGNOSIS — H25.812 COMBINED FORMS OF AGE-RELATED CATARACT, LEFT EYE: Primary | ICD-10-CM

## 2022-05-11 PROCEDURE — 66984 XCAPSL CTRC RMVL W/O ECP: CPT | Performed by: OPHTHALMOLOGY

## 2022-05-12 ENCOUNTER — POST-OPERATIVE VISIT (OUTPATIENT)
Dept: URBAN - METROPOLITAN AREA CLINIC 59 | Facility: CLINIC | Age: 77
End: 2022-05-12
Payer: COMMERCIAL

## 2022-05-12 DIAGNOSIS — Z96.1 PRESENCE OF INTRAOCULAR LENS: Primary | ICD-10-CM

## 2022-05-12 PROCEDURE — 99024 POSTOP FOLLOW-UP VISIT: CPT | Performed by: OPTOMETRIST

## 2022-05-12 ASSESSMENT — INTRAOCULAR PRESSURE: OS: 19

## 2022-05-12 NOTE — IMPRESSION/PLAN
Impression: S/P Cataract Extraction by phacoemulsification with IOL placement OS - 1 Day. Presence of intraocular lens  Z96.1.  Excellent post op course   Post operative instructions reviewed - Plan: --Continue Ofloxacin 0.3% QID OS for 1 week

## 2022-05-16 ENCOUNTER — HEALTH MAINTENANCE LETTER (OUTPATIENT)
Age: 77
End: 2022-05-16

## 2022-05-19 ENCOUNTER — POST-OPERATIVE VISIT (OUTPATIENT)
Dept: URBAN - METROPOLITAN AREA CLINIC 59 | Facility: CLINIC | Age: 77
End: 2022-05-19
Payer: COMMERCIAL

## 2022-05-19 PROCEDURE — 99024 POSTOP FOLLOW-UP VISIT: CPT | Performed by: OPTOMETRIST

## 2022-05-19 RX ORDER — OFLOXACIN 3 MG/ML
0.3 % SOLUTION/ DROPS OPHTHALMIC
Qty: 5 | Refills: 1 | Status: INACTIVE
Start: 2022-05-19 | End: 2022-05-19

## 2022-05-19 ASSESSMENT — VISUAL ACUITY
OD: 20/20
OS: 20/20

## 2022-05-19 ASSESSMENT — INTRAOCULAR PRESSURE
OS: 13
OD: 13

## 2022-05-19 NOTE — IMPRESSION/PLAN
Impression: S/P Cataract Extraction by phacoemulsification with IOL placement OS - 8 Days. Presence of intraocular lens  Z96.1.  Excellent post op course   Post operative instructions reviewed - Plan:

## 2022-06-02 ENCOUNTER — POST-OPERATIVE VISIT (OUTPATIENT)
Dept: URBAN - METROPOLITAN AREA CLINIC 59 | Facility: CLINIC | Age: 77
End: 2022-06-02
Payer: COMMERCIAL

## 2022-06-02 DIAGNOSIS — Z96.1 PRESENCE OF INTRAOCULAR LENS: ICD-10-CM

## 2022-06-02 DIAGNOSIS — H52.4 PRESBYOPIA: Primary | ICD-10-CM

## 2022-06-02 PROCEDURE — 99024 POSTOP FOLLOW-UP VISIT: CPT | Performed by: OPTOMETRIST

## 2022-06-02 ASSESSMENT — VISUAL ACUITY
OS: 20/30
OD: 20/25

## 2022-06-02 ASSESSMENT — INTRAOCULAR PRESSURE
OS: 15
OD: 14

## 2022-06-02 NOTE — IMPRESSION/PLAN
Impression: S/P Cataract Extraction by phacoemulsification with IOL placement OS - 22 Days. Presence of intraocular lens  Z96.1. Excellent post op course   Post operative instructions reviewed - Plan: New glasses Rx was given today.

## 2022-09-11 ENCOUNTER — HEALTH MAINTENANCE LETTER (OUTPATIENT)
Age: 77
End: 2022-09-11

## 2022-10-04 ENCOUNTER — OFFICE VISIT (OUTPATIENT)
Dept: URBAN - METROPOLITAN AREA CLINIC 59 | Facility: CLINIC | Age: 77
End: 2022-10-04
Payer: COMMERCIAL

## 2022-10-04 DIAGNOSIS — Z96.1 PRESENCE OF INTRAOCULAR LENS: ICD-10-CM

## 2022-10-04 DIAGNOSIS — H43.813 VITREOUS DEGENERATION, BILATERAL: ICD-10-CM

## 2022-10-04 DIAGNOSIS — H16.223 KERATOCONJUNCTIVITIS SICCA, BILATERAL: Primary | ICD-10-CM

## 2022-10-04 DIAGNOSIS — H26.493 OTHER SECONDARY CATARACT, BILATERAL: ICD-10-CM

## 2022-10-04 PROCEDURE — 92014 COMPRE OPH EXAM EST PT 1/>: CPT | Performed by: OPTOMETRIST

## 2022-10-04 ASSESSMENT — INTRAOCULAR PRESSURE
OD: 16
OS: 15

## 2022-10-04 NOTE — IMPRESSION/PLAN
Impression: Vitreous degeneration, bilateral: H43.813. Plan: Stable. Continue to monitor. Symptoms of retinal detachment or tears were discussed in detail. If patient notices any symptoms discussed, contact office ASAP.

## 2022-10-04 NOTE — IMPRESSION/PLAN
Impression: Keratoconjunctivitis sicca, bilateral: T47.875. Plan: Patient to continue frequent artificial tears.

## 2022-10-04 NOTE — IMPRESSION/PLAN
Impression: Other secondary cataract, bilateral: H26.493. Plan: Trace. Patient educated on findings. Opacified capsule is not affecting vision. No indication for treatment at this time. Return to clinic if there is a decrease in vision.

## 2023-06-03 ENCOUNTER — HEALTH MAINTENANCE LETTER (OUTPATIENT)
Age: 78
End: 2023-06-03